# Patient Record
Sex: FEMALE | Race: AMERICAN INDIAN OR ALASKA NATIVE
[De-identification: names, ages, dates, MRNs, and addresses within clinical notes are randomized per-mention and may not be internally consistent; named-entity substitution may affect disease eponyms.]

---

## 2018-01-24 ENCOUNTER — HOSPITAL ENCOUNTER (INPATIENT)
Dept: HOSPITAL 80 - FLD | Age: 23
LOS: 3 days | Discharge: HOME | End: 2018-01-27
Attending: OBSTETRICS & GYNECOLOGY | Admitting: OBSTETRICS & GYNECOLOGY
Payer: MEDICAID

## 2018-01-24 DIAGNOSIS — R33.9: ICD-10-CM

## 2018-01-24 DIAGNOSIS — Z3A.39: ICD-10-CM

## 2018-01-24 LAB — PLATELET # BLD: 280 10^3/UL (ref 150–400)

## 2018-01-24 PROCEDURE — 0KQM0ZZ REPAIR PERINEUM MUSCLE, OPEN APPROACH: ICD-10-PCS | Performed by: OBSTETRICS & GYNECOLOGY

## 2018-01-24 PROCEDURE — 0DQP7ZZ REPAIR RECTUM, VIA NATURAL OR ARTIFICIAL OPENING: ICD-10-PCS | Performed by: OBSTETRICS & GYNECOLOGY

## 2018-01-24 RX ADMIN — DOCUSATE SODIUM SCH MG: 100 CAPSULE, LIQUID FILLED ORAL at 10:16

## 2018-01-24 RX ADMIN — IBUPROFEN PRN MG: 600 TABLET ORAL at 23:16

## 2018-01-24 RX ADMIN — IBUPROFEN PRN MG: 600 TABLET ORAL at 07:49

## 2018-01-24 RX ADMIN — DOCUSATE SODIUM SCH: 100 CAPSULE, LIQUID FILLED ORAL at 23:44

## 2018-01-24 NOTE — PDGENHP
History and Physical





- Chief Complaint


Delivery at home





- History of Present Illness


Patient is a 22 year old G1--P1 who delivered at home term baby.  Patient is a 

midwife patient and delivered at about 0200 and arrived at 0337 and delivered 

placenta.  Per patient no high risk factors.





History Information





- Allergies/Home Medication List


Allergies/Adverse Reactions: 








ciprofloxacin [From Cipro] Allergy (Verified 18 03:59)


 


Penicillins Allergy (Verified 18 03:59)


 





I have personally reviewed and updated: medical history





- Social History


Smoking Status: Never smoked





Review of Systems


Review of Systems: 








Physical Exam


Physical Exam: 





Cardiovascular: regular rate and rhythym, no murmur, rub, or gallop


Respiratory: no respiratory distress, no rales or rhonchi, clear to auscultation


Gastrointestinal: normoactive bowel sounds, soft, non-tender abdomen


Genitourinary: other (vaginal laceration 4th degree discussed repair with 

patient)


Skin: warm, normal color, no rashes or abrasions


Musculoskeletal: full muscle strength


Neurologic: AAOx3





Lab Data & Imaging Review





 18 04:10

















WBC  11.64 10^3/uL (3.80-9.50)  H  18  04:10    


 


RBC  4.63 10^6/uL (4.18-5.33)   18  04:10    


 


Hgb  12.4 g/dL (12.6-16.3)  L  18  04:10    


 


Hct  39.3 % (38.0-47.0)   18  04:10    


 


MCV  84.9 fL (81.5-99.8)   18  04:10    


 


MCH  26.8 pg (27.9-34.1)  L  18  04:10    


 


MCHC  31.6 g/dL (32.4-36.7)  L  18  04:10    


 


RDW  14.6 % (11.5-15.2)   18  04:10    


 


Plt Count  280 10^3/uL (150-400)   18  04:10    


 


MPV  10.5 fL (8.7-11.7)   18  04:10    


 


Neut % (Auto)  87.7 % (39.3-74.2)  H  18  04:10    


 


Lymph % (Auto)  8.5 % (15.0-45.0)  L  18  04:10    


 


Mono % (Auto)  2.1 % (4.5-13.0)  L  18  04:10    


 


Eos % (Auto)  0.0 % (0.6-7.6)  L  18  04:10    


 


Baso % (Auto)  0.3 % (0.3-1.7)   18  04:10    


 


Nucleat RBC Rel Count  0.0 % (0.0-0.2)   18  04:10    


 


Absolute Neuts (auto)  10.20 10^3/uL (1.70-6.50)  H  18  04:10    


 


Absolute Lymphs (auto)  0.99 10^3/uL (1.00-3.00)  L  18  04:10    


 


Absolute Monos (auto)  0.25 10^3/uL (0.30-0.80)  L  18  04:10    


 


Absolute Eos (auto)  0.00 10^3/uL (0.03-0.40)  L  18  04:10    


 


Absolute Basos (auto)  0.04 10^3/uL (0.02-0.10)   18  04:10    


 


Absolute Nucleated RBC  0.00 10^3/uL (0-0.01)   18  04:10    


 


Immature Gran %  1.4 % (0.0-1.1)  H  18  04:10    


 


Immature Gran #  0.16 10^3/uL (0.00-0.10)  H  18  04:10    


 


Patient ABO/Rh  O POSITIVE   18  04:10    


 


Antibody Screen  NEGATIVE   18  04:10    











Assessment & Plan


Assessment: 





Post partum day 0 


2nd degree laceration





Plan: 





fourth degree laceration repaired under local


Flagyl and stool softener


Continue with routine postpartum care

## 2018-01-24 NOTE — SOAPPROG
SOAP Progress Note


Assessment/Plan: 


Assessment:





[id number]


s/p 


4th degree laceration


unable to void














Plan:


urinary catheter placed, will reassess in 12-24 hrs


will cont to  monitor for S&S of infection


routine PP care





18 20:41





Subjective: 





called to pts room due to pt unable to void; had straight cath this afternoon- 

with no void since. She denies any pain or heavy bleeding.


Objective: 





 Vital Signs











Temp Pulse Resp BP Pulse Ox


 


 37 C   125 H  16   105/64   96 


 


 18 11:15  18 16:19  18 11:15  18 16:19  18 16:19








 Laboratory Results





 18 04:10 





 











 18





 05:59 05:59 05:59


 


Output Total  200 75


 


Balance  -200 -75














Physical Exam





- Physical Exam


General Appearance: WD/WN, alert, no apparent distress


Neck: supple


Respiratory: lungs clear, normal breath sounds


Cardiac/Chest: regular rate, rhythm


Abdomen: non-tender, soft


Pelvic Exam: other (EFG: perineum healing well, no d/c, no odor, sutures intact)


Skin: normal color, warm/dry


Neuro/Psych: alert, normal mood/affect, oriented x 3





ICD10 Worksheet


Patient Problems: 


 Problems











Problem Status Onset


 


Fourth degree perineal laceration Acute  


 


Vaginal delivery Acute

## 2018-01-24 NOTE — SOAPPROG
SOAP Progress Note


Assessment/Plan: 


Assessment:


s/p  at home with placenta delivered on Labor and Delivery


4th degree laceration























Plan:


4th degree laceration repaired under local


Flagyl and colace given.


18 09:53





Subjective: 





Doing well no complaints s/p  at home with delivery of Placenta on labor 

and delivery


Objective: 





 Laboratory Results





 18 04:10 





 











 18





 05:59 05:59 05:59


 


Output Total  200 


 


Balance  -200 








Procedure: Repair of fourth degree laceration


Speculum exam revealed normal cervix, 4th degree laceration and left sidewall 

laceration


Patient was prepped with betadine.


Repair of rectal tissue and muscle repaired in normal sterile fashion in layers


2nd degree laceration repaired


2-0 vicryl used


Rectal exam revealed normal sphincter tone


75cc EBL 100UOP


Patient tolerated well and RX flagyl and colace





- Time Spent With Patient


Time Spent With Patient: 





45 min





ICD10 Worksheet


Patient Problems: 


 Problems











Problem Status Onset


 


Fourth degree perineal laceration Acute  


 


Vaginal delivery Acute  














- ICD10 Problem Qualifiers


(1) Fourth degree perineal laceration








(2) Vaginal delivery

## 2018-01-25 LAB — PLATELET # BLD: 169 10^3/UL (ref 150–400)

## 2018-01-25 RX ADMIN — CLINDAMYCIN PHOSPHATE SCH MLS: 18 INJECTION, SOLUTION INTRAVENOUS at 23:01

## 2018-01-25 RX ADMIN — IRON SUPPLEMENT SCH MG: 325 TABLET ORAL at 15:53

## 2018-01-25 RX ADMIN — IBUPROFEN PRN MG: 600 TABLET ORAL at 20:10

## 2018-01-25 RX ADMIN — DOCUSATE SODIUM SCH MG: 100 CAPSULE, LIQUID FILLED ORAL at 09:52

## 2018-01-25 RX ADMIN — IRON SUPPLEMENT SCH MG: 325 TABLET ORAL at 09:52

## 2018-01-25 RX ADMIN — IRON SUPPLEMENT SCH MG: 325 TABLET ORAL at 21:40

## 2018-01-25 RX ADMIN — DOCUSATE SODIUM SCH MG: 100 CAPSULE, LIQUID FILLED ORAL at 20:10

## 2018-01-25 RX ADMIN — GENTAMICIN SULFATE SCH MLS: 0.8 INJECTION, SOLUTION INTRAVENOUS at 21:40

## 2018-01-25 NOTE — OBPP
PostPartum Progress Note


Assessment/Plan: 


Assessment:


s/p  at home with placenta delivered on Labor and Delivery


4th degree laceration


Fainting overnight and unable to void


Lozano placed overnight























Plan:


4th degree laceration repaired under local


Flagyl and colace given.


18 09:53





18 08:19


D/C lozano


Continue Flagyl


Check CBC and Iron started


Objective: 





 





 18 04:10 





 











Patient ABO/Rh  O POSITIVE   18  04:10    








 











Temp Pulse Resp BP Pulse Ox


 


 36.8 C   106 H  18   113/73   98 


 


 18 05:00  18 00:05  18 00:05  18 00:05  18 00:05














PostPartum Physical Exam





- Physical Exam


Neck: non-tender, full range of motion


Respiratory: chest non-tender, lungs clear, normal breath sounds


Cardiac/Chest: normal peripheral pulses, regular rate, rhythm


Extremities: normal range of motion, non-tender


Skin: normal color, warm/dry


Neuro/Psych: no motor/sensory deficits, alert, normal mood/affect, oriented x 3

## 2018-01-25 NOTE — ASMTCMCOM
CM Note

 

CM Note                       

Notes:

SW consult requested by pt's RN due to concerns of depression and hx of social difficulties with 

some agoraphobia. Met with pt and her mother. Pt had been in progress of setting up [facility] when baby was born. LM for [facility]. If they are not able to also supply therapist, will 

make referral to [facility]. Pt already had appot with [facility]. Pt lives with her mother who is very 

supportive.FOC also involved. Pt works in the [employer] at [employer]. SW will follow to set up [facility] 

and possibly [facility].

 

Date Signed:  01/25/2018 03:32 PM

Electronically Signed By:Denise Reyes LCSW

## 2018-01-26 LAB — PLATELET # BLD: 157 10^3/UL (ref 150–400)

## 2018-01-26 RX ADMIN — IBUPROFEN PRN MG: 600 TABLET ORAL at 02:16

## 2018-01-26 RX ADMIN — GENTAMICIN SULFATE SCH MLS: 0.8 INJECTION, SOLUTION INTRAVENOUS at 05:16

## 2018-01-26 RX ADMIN — IBUPROFEN PRN MG: 600 TABLET ORAL at 08:27

## 2018-01-26 RX ADMIN — GENTAMICIN SULFATE SCH MLS: 0.8 INJECTION, SOLUTION INTRAVENOUS at 20:49

## 2018-01-26 RX ADMIN — DOCUSATE SODIUM SCH MG: 100 CAPSULE, LIQUID FILLED ORAL at 08:28

## 2018-01-26 RX ADMIN — IRON SUPPLEMENT SCH MG: 325 TABLET ORAL at 08:27

## 2018-01-26 RX ADMIN — DOCUSATE SODIUM SCH MG: 100 CAPSULE, LIQUID FILLED ORAL at 21:52

## 2018-01-26 RX ADMIN — CLINDAMYCIN PHOSPHATE SCH MLS: 18 INJECTION, SOLUTION INTRAVENOUS at 14:09

## 2018-01-26 RX ADMIN — IBUPROFEN PRN MG: 600 TABLET ORAL at 21:18

## 2018-01-26 RX ADMIN — IRON SUPPLEMENT SCH MG: 325 TABLET ORAL at 16:59

## 2018-01-26 RX ADMIN — GENTAMICIN SULFATE SCH MLS: 0.8 INJECTION, SOLUTION INTRAVENOUS at 12:48

## 2018-01-26 RX ADMIN — CLINDAMYCIN PHOSPHATE SCH MLS: 18 INJECTION, SOLUTION INTRAVENOUS at 06:29

## 2018-01-26 RX ADMIN — CLINDAMYCIN PHOSPHATE SCH MLS: 18 INJECTION, SOLUTION INTRAVENOUS at 22:03

## 2018-01-26 NOTE — OBPP
PostPartum Progress Note


Assessment/Plan: 


Assessment:


s/p  at home with placenta delivered on Labor and Delivery


4th degree laceration


Had lozano in and removed voided well


Amemia























Plan:


4th degree laceration repaired under local


Flagyl and colace given.


18 09:53





18 08:19


D/C lozano


Continue Flagyl


Check CBC and Iron started


18 08:16


Anemia discussed possible transfusion


18 08:22


Patient refusing transfusion and remains asymptomatic patient to stay another 

night check cbc in am


Subjective/Postpartum Course: 





18 08:23


Doing well. No diarrhea +flatus min bleeding.  Tolerating diet and ambulating


Objective: 





 





 18 06:30 





 18 06:30 





 











Patient ABO/Rh  O POSITIVE   18  04:10    








 











Temp Pulse Resp BP Pulse Ox


 


 37.1 C   88   20   100/67   98 


 


 18 02:15  18 02:15  18 02:15  18 02:15  18 02:15











Uterine Position/Fundal Height: Umbilicus -2


Uterine Tone: Firm





PostPartum Physical Exam





- Physical Exam


Neck: non-tender, full range of motion


Respiratory: chest non-tender, lungs clear, normal breath sounds


Cardiac/Chest: normal peripheral pulses, regular rate, rhythm


Abdomen: normal bowel sounds, non-tender, other (Pelvic normal perineum)


Skin: normal color, warm/dry


Neuro/Psych: no motor/sensory deficits, alert, normal mood/affect

## 2018-01-26 NOTE — ASMTCMCOM
CM Note

 

CM Note                       

Notes:

[facility] received referral and will reach out to pt. Faxed [facility] referral so that 

pt can also psychosocial support. Met with FOC and his 7 y/o son in the room. He appears very 

supportive of pt and infant. CM tofoolow as needed.

 

Date Signed:  01/26/2018 05:02 PM

Electronically Signed By:Denise Reyes LCSW

## 2018-01-27 VITALS
SYSTOLIC BLOOD PRESSURE: 118 MMHG | DIASTOLIC BLOOD PRESSURE: 73 MMHG | RESPIRATION RATE: 17 BRPM | OXYGEN SATURATION: 98 % | TEMPERATURE: 98.5 F

## 2018-01-27 VITALS — HEART RATE: 96 BPM

## 2018-01-27 LAB — PLATELET # BLD: 202 10^3/UL (ref 150–400)

## 2018-01-27 RX ADMIN — IBUPROFEN PRN MG: 600 TABLET ORAL at 06:30

## 2018-01-27 RX ADMIN — CLINDAMYCIN PHOSPHATE SCH MLS: 18 INJECTION, SOLUTION INTRAVENOUS at 07:22

## 2018-01-27 RX ADMIN — GENTAMICIN SULFATE SCH MLS: 0.8 INJECTION, SOLUTION INTRAVENOUS at 05:38

## 2018-01-27 RX ADMIN — IBUPROFEN PRN MG: 600 TABLET ORAL at 12:33

## 2018-01-27 RX ADMIN — CLINDAMYCIN PHOSPHATE SCH: 18 INJECTION, SOLUTION INTRAVENOUS at 15:16

## 2018-01-27 RX ADMIN — DOCUSATE SODIUM SCH MG: 100 CAPSULE, LIQUID FILLED ORAL at 10:07

## 2018-01-27 RX ADMIN — IRON SUPPLEMENT SCH MG: 325 TABLET ORAL at 10:07

## 2018-01-27 RX ADMIN — GENTAMICIN SULFATE SCH: 0.8 INJECTION, SOLUTION INTRAVENOUS at 13:32

## 2018-01-27 NOTE — OBGCSDC
General Delivery Information





- General Info


: 1


Para: 1


Abortions: 0


L&D Analgesia/Anesthesia Type: IV Narcotics, Local


Admission Date: 18


Labs: 





 











Patient ABO/Rh  O POSITIVE   18  04:10    


 


Hct  19.3 % (38.0-47.0)  L  18  06:15    














- Hospital Course


Postpartum: 





18 08:23


Doing well. No diarrhea +flatus min bleeding.  Tolerating diet and ambulating


18 08:17


Patient is ambulating tolerating diet and +Flatus.  Refused transfusion however 

is taking Iron. Patient is ready to go home





[place] Birth Data


ILIANA: 18


Gestational Age: 39 week(s) and 5 day(s)





Discharge Information





- Discharge Information


Prescriptions: 


Hydrocodone/APAP 5/325 [Norco 5/325 (*)] 1 - 2 tab PO Q4HRS PRN #30 tab


 PRN Reason: Pain, Moderate


Ibuprofen [Motrin (*)] 600 mg PO Q6HRS PRN #30 tab


 PRN Reason: Pain, Inflammatory/Cramping


metroNIDAZOLE [Flagyl 500 mg (*)] 500 mg PO Q12HRS #6 tab


Docusate Sodium [Colace 100 MG (*)] 100 mg PO BID PRN #30 cap


 PRN Reason: Constipation


Ferrous Sulfate [Ferrous Sulf 325 MG (*)] 325 mg PO TID #60 tab


Condition: Good


Instruction/Follow Up: Four Weeks, Six Weeks (four weeks for wellness check and 

6 weeks post partum check)

## 2018-01-27 NOTE — OBPP
PostPartum Progress Note


Assessment/Plan: 


Assessment:


s/p  at home with placenta delivered on Labor and Delivery


4th degree laceration


Had lozano in and removed voided well


Amemia


























Plan:


4th degree laceration repaired under local


Flagyl and colace given.


18 09:53





18 08:19


D/C lozano


Continue Flagyl


Check CBC and Iron started


18 08:16


Anemia discussed possible transfusion


18 08:22


Patient refusing transfusion and remains asymptomatic patient to stay another 

night check cbc in am


18 08:12


 Hct stable patient ready to go home. 


Discharge home 


Follow up at 4 and 6 weeks


Subjective/Postpartum Course: 





18 08:23


Doing well. No diarrhea +flatus min bleeding.  Tolerating diet and ambulating


18 08:17


Patient is ambulating tolerating diet and +Flatus.  Refused transfusion however 

is taking Iron. Patient is ready to go home


Objective: 





 





 18 06:15 





 18 06:30 





 











Patient ABO/Rh  O POSITIVE   18  04:10    








 











Temp Pulse Resp BP Pulse Ox


 


 36.7 C   99   12   119/80   96 


 


 18 20:30  18 20:30  18 20:30  18 20:30  18 20:30











Uterine Position/Fundal Height: Umbilicus -2





PostPartum Physical Exam





- Physical Exam


Neck: non-tender, full range of motion


Respiratory: chest non-tender, lungs clear, normal breath sounds


Cardiac/Chest: normal peripheral pulses, regular rate, rhythm


Abdomen: normal bowel sounds, non-tender


Skin: normal color, warm/dry


Neuro/Psych: no motor/sensory deficits, alert, normal mood/affect, oriented x 3

## 2018-02-15 ENCOUNTER — HOSPITAL ENCOUNTER (OUTPATIENT)
Dept: HOSPITAL 80 - FLACT | Age: 23
End: 2018-02-15
Attending: OBSTETRICS & GYNECOLOGY
Payer: MEDICAID

## 2018-02-15 PROCEDURE — G0463 HOSPITAL OUTPT CLINIC VISIT: HCPCS
